# Patient Record
Sex: MALE | Race: BLACK OR AFRICAN AMERICAN | URBAN - METROPOLITAN AREA
[De-identification: names, ages, dates, MRNs, and addresses within clinical notes are randomized per-mention and may not be internally consistent; named-entity substitution may affect disease eponyms.]

---

## 2020-10-21 ENCOUNTER — TRANSCRIBE ORDER (OUTPATIENT)
Dept: SLEEP MEDICINE | Age: 70
End: 2020-10-21

## 2020-10-21 DIAGNOSIS — G47.33 OSA (OBSTRUCTIVE SLEEP APNEA): Primary | ICD-10-CM

## 2020-10-21 DIAGNOSIS — R06.83 SNORING: ICD-10-CM

## 2020-10-21 DIAGNOSIS — J43.9 EMPHYSEMA OF LUNG (HCC): ICD-10-CM

## 2020-10-21 DIAGNOSIS — E78.5 HYPERLIPIDEMIA: ICD-10-CM

## 2022-02-24 ENCOUNTER — OFFICE VISIT (OUTPATIENT)
Dept: ORTHOPEDIC SURGERY | Age: 72
End: 2022-02-24
Payer: MEDICARE

## 2022-02-24 VITALS
OXYGEN SATURATION: 92 % | RESPIRATION RATE: 16 BRPM | TEMPERATURE: 97 F | HEART RATE: 86 BPM | BODY MASS INDEX: 41.73 KG/M2 | HEIGHT: 62 IN | WEIGHT: 226.8 LBS

## 2022-02-24 DIAGNOSIS — M25.562 CHRONIC PAIN OF LEFT KNEE: Primary | ICD-10-CM

## 2022-02-24 DIAGNOSIS — G89.29 CHRONIC PAIN OF LEFT KNEE: Primary | ICD-10-CM

## 2022-02-24 DIAGNOSIS — M17.12 PRIMARY OSTEOARTHRITIS OF LEFT KNEE: ICD-10-CM

## 2022-02-24 PROCEDURE — 73562 X-RAY EXAM OF KNEE 3: CPT | Performed by: SPECIALIST

## 2022-02-24 PROCEDURE — G8432 DEP SCR NOT DOC, RNG: HCPCS | Performed by: SPECIALIST

## 2022-02-24 PROCEDURE — G8427 DOCREV CUR MEDS BY ELIG CLIN: HCPCS | Performed by: SPECIALIST

## 2022-02-24 PROCEDURE — 99203 OFFICE O/P NEW LOW 30 MIN: CPT | Performed by: SPECIALIST

## 2022-02-24 PROCEDURE — G8417 CALC BMI ABV UP PARAM F/U: HCPCS | Performed by: SPECIALIST

## 2022-02-24 PROCEDURE — 1101F PT FALLS ASSESS-DOCD LE1/YR: CPT | Performed by: SPECIALIST

## 2022-02-24 PROCEDURE — 20610 DRAIN/INJ JOINT/BURSA W/O US: CPT | Performed by: SPECIALIST

## 2022-02-24 PROCEDURE — G8536 NO DOC ELDER MAL SCRN: HCPCS | Performed by: SPECIALIST

## 2022-02-24 PROCEDURE — 3017F COLORECTAL CA SCREEN DOC REV: CPT | Performed by: SPECIALIST

## 2022-02-24 RX ORDER — ALBUTEROL SULFATE 90 UG/1
AEROSOL, METERED RESPIRATORY (INHALATION)
COMMUNITY
Start: 2022-01-04

## 2022-02-24 RX ORDER — ROSUVASTATIN CALCIUM 5 MG/1
TABLET, COATED ORAL
COMMUNITY
Start: 2022-01-25

## 2022-02-24 RX ORDER — BETAMETHASONE SODIUM PHOSPHATE AND BETAMETHASONE ACETATE 3; 3 MG/ML; MG/ML
3 INJECTION, SUSPENSION INTRA-ARTICULAR; INTRALESIONAL; INTRAMUSCULAR; SOFT TISSUE ONCE
Status: COMPLETED | OUTPATIENT
Start: 2022-02-24 | End: 2022-02-24

## 2022-02-24 RX ORDER — FINASTERIDE 5 MG/1
TABLET, FILM COATED ORAL
COMMUNITY
Start: 2022-02-18

## 2022-02-24 RX ADMIN — BETAMETHASONE SODIUM PHOSPHATE AND BETAMETHASONE ACETATE 3 MG: 3; 3 INJECTION, SUSPENSION INTRA-ARTICULAR; INTRALESIONAL; INTRAMUSCULAR; SOFT TISSUE at 14:25
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
## 2022-03-18 ENCOUNTER — OFFICE VISIT (OUTPATIENT)
Dept: ORTHOPEDIC SURGERY | Age: 72
End: 2022-03-18
Payer: MEDICARE

## 2022-03-18 VITALS
WEIGHT: 221 LBS | OXYGEN SATURATION: 92 % | HEART RATE: 83 BPM | BODY MASS INDEX: 40.67 KG/M2 | TEMPERATURE: 97.5 F | HEIGHT: 62 IN

## 2022-03-18 DIAGNOSIS — M25.462 EFFUSION OF LEFT KNEE: ICD-10-CM

## 2022-03-18 DIAGNOSIS — Z91.81 AT RISK FOR FALLS: ICD-10-CM

## 2022-03-18 DIAGNOSIS — G89.29 CHRONIC PAIN OF LEFT KNEE: ICD-10-CM

## 2022-03-18 DIAGNOSIS — M25.562 CHRONIC PAIN OF LEFT KNEE: ICD-10-CM

## 2022-03-18 DIAGNOSIS — M17.12 PRIMARY OSTEOARTHRITIS OF LEFT KNEE: Primary | ICD-10-CM

## 2022-03-18 PROCEDURE — G8536 NO DOC ELDER MAL SCRN: HCPCS | Performed by: SPECIALIST

## 2022-03-18 PROCEDURE — G8427 DOCREV CUR MEDS BY ELIG CLIN: HCPCS | Performed by: SPECIALIST

## 2022-03-18 PROCEDURE — 3017F COLORECTAL CA SCREEN DOC REV: CPT | Performed by: SPECIALIST

## 2022-03-18 PROCEDURE — 1101F PT FALLS ASSESS-DOCD LE1/YR: CPT | Performed by: SPECIALIST

## 2022-03-18 PROCEDURE — 99213 OFFICE O/P EST LOW 20 MIN: CPT | Performed by: SPECIALIST

## 2022-03-18 PROCEDURE — 20610 DRAIN/INJ JOINT/BURSA W/O US: CPT | Performed by: SPECIALIST

## 2022-03-18 PROCEDURE — G8417 CALC BMI ABV UP PARAM F/U: HCPCS | Performed by: SPECIALIST

## 2022-03-18 PROCEDURE — G8432 DEP SCR NOT DOC, RNG: HCPCS | Performed by: SPECIALIST
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
## 2022-03-25 ENCOUNTER — OFFICE VISIT (OUTPATIENT)
Dept: ORTHOPEDIC SURGERY | Age: 72
End: 2022-03-25
Payer: MEDICARE

## 2022-03-25 VITALS — BODY MASS INDEX: 40.42 KG/M2 | HEIGHT: 62 IN | TEMPERATURE: 97.3 F

## 2022-03-25 DIAGNOSIS — M25.562 CHRONIC PAIN OF LEFT KNEE: ICD-10-CM

## 2022-03-25 DIAGNOSIS — M17.12 PRIMARY OSTEOARTHRITIS OF LEFT KNEE: Primary | ICD-10-CM

## 2022-03-25 DIAGNOSIS — G89.29 CHRONIC PAIN OF LEFT KNEE: ICD-10-CM

## 2022-03-25 PROCEDURE — 20610 DRAIN/INJ JOINT/BURSA W/O US: CPT | Performed by: SPECIALIST

## 2022-03-25 NOTE — PROGRESS NOTES
Patient: Ming Montes                MRN: [id number]       SSN: [id number]  YOB: 1950        AGE: 80 y.o. SEX: male  Body mass index is 40.42 kg/m². PCP: Kyung Wolf MD  03/25/22    Chief Complaint   Patient presents with    Knee Pain     left knee pain     HISTORY:  Ming Montes is a 80 y.o. male who is seen for left knee pain. ICD-10-CM ICD-9-CM    1. Primary osteoarthritis of left knee  M17.12 715.16 sodium hyaluronate (viscosup) (ORTHOVISC) 30 mg/2 mL injection syrg 30 mg      DRAIN/INJECT LARGE JOINT/BURSA   2. Chronic pain of left knee  M25.562 719.46 sodium hyaluronate (viscosup) (ORTHOVISC) 30 mg/2 mL injection syrg 30 mg    G89.29 338.29 DRAIN/INJECT LARGE JOINT/BURSA       Chart reviewed for the following:   Ramon German MD, have reviewed the History, Physical and updated the Allergic reactions for [address] performed immediately prior to start of procedure:  Ramon German MD, have performed the following reviews on Ming Montes prior to the start of the procedure:            * Patient was identified by name and date of birth   * Agreement on procedure being performed was verified  * Risks and Benefits explained to the patient  * Procedure site verified and marked as necessary  * Patient was positioned for comfort  * Consent was obtained     Time: 2:59 PM    Date of procedure: 3/25/2022    Procedure performed by:  Eleanor Howell MD    Mr. Louie White tolerated the procedure well with no complications. PLAN:  After discussing treatment options, patient's left knee was injected with 2 cc of Orthovisc. Mr. Louie White will follow up in one week to continue his visco supplementation injection series.       Scribed by MD Obey Castellanos) as dictated by Eleanor Howell MD

## 2022-04-01 ENCOUNTER — OFFICE VISIT (OUTPATIENT)
Dept: ORTHOPEDIC SURGERY | Age: 72
End: 2022-04-01
Payer: MEDICARE

## 2022-04-01 VITALS
HEART RATE: 87 BPM | OXYGEN SATURATION: 90 % | WEIGHT: 231.2 LBS | HEIGHT: 62 IN | TEMPERATURE: 97.3 F | BODY MASS INDEX: 42.55 KG/M2

## 2022-04-01 DIAGNOSIS — M25.562 CHRONIC PAIN OF LEFT KNEE: ICD-10-CM

## 2022-04-01 DIAGNOSIS — G89.29 CHRONIC PAIN OF LEFT KNEE: ICD-10-CM

## 2022-04-01 DIAGNOSIS — M17.12 PRIMARY OSTEOARTHRITIS OF LEFT KNEE: Primary | ICD-10-CM

## 2022-04-01 PROCEDURE — 20610 DRAIN/INJ JOINT/BURSA W/O US: CPT | Performed by: SPECIALIST

## 2022-04-01 NOTE — PROGRESS NOTES
Patient: Jordyn Dye                MRN: [id number]       SSN: [id number]  YOB: 1950        AGE: 80 y.o. SEX: male  Body mass index is 42.29 kg/m². PCP: Dinesh Ross MD  04/01/22    Chief Complaint   Patient presents with    Knee Pain     LT     HISTORY:  Jordyn Dye is a 80 y.o. male who is seen for left knee pain. ICD-10-CM ICD-9-CM    1. Primary osteoarthritis of left knee  M17.12 715.16 DRAIN/INJECT LARGE JOINT/BURSA      sodium hyaluronate (viscosup) (ORTHOVISC) 30 mg/2 mL injection syrg 30 mg   2. Chronic pain of left knee  M25.562 719.46 DRAIN/INJECT LARGE JOINT/BURSA    G89.29 338.29 sodium hyaluronate (viscosup) (ORTHOVISC) 30 mg/2 mL injection syrg 30 mg       Chart reviewed for the following:   Attila Paz MD, have reviewed the History, Physical and updated the Allergic reactions for [facility] [address] performed immediately prior to start of procedure:  Attila Paz MD, have performed the following reviews on Jordyn Dye prior to the start of the procedure:            * Patient was identified by name and date of birth   * Agreement on procedure being performed was verified  * Risks and Benefits explained to the patient  * Procedure site verified and marked as necessary  * Patient was positioned for comfort  * Consent was obtained     Time: 2:22 PM     Date of procedure: 4/1/2022    Procedure performed by:  Rashida Turner MD    Mr. Veronica Smith tolerated the procedure well with no complications. PLAN:  After discussing treatment options, patient's left knee was injected with 2 cc of Orthovisc. Mr. Veronica Smith will follow up in one week to complete his visco supplementation injection series.       Scribed by Rashida Turner MD [address]) as dictated by Rashida Turner MD

## 2022-04-08 ENCOUNTER — OFFICE VISIT (OUTPATIENT)
Dept: ORTHOPEDIC SURGERY | Age: 72
End: 2022-04-08
Payer: MEDICARE

## 2022-04-08 VITALS — WEIGHT: 231 LBS | TEMPERATURE: 98.9 F | BODY MASS INDEX: 42.25 KG/M2 | OXYGEN SATURATION: 90 %

## 2022-04-08 DIAGNOSIS — M25.562 CHRONIC PAIN OF LEFT KNEE: ICD-10-CM

## 2022-04-08 DIAGNOSIS — M25.462 EFFUSION OF LEFT KNEE: ICD-10-CM

## 2022-04-08 DIAGNOSIS — G89.29 CHRONIC PAIN OF LEFT KNEE: ICD-10-CM

## 2022-04-08 DIAGNOSIS — M17.12 PRIMARY OSTEOARTHRITIS OF LEFT KNEE: Primary | ICD-10-CM

## 2022-04-08 PROCEDURE — 20610 DRAIN/INJ JOINT/BURSA W/O US: CPT | Performed by: SPECIALIST

## 2022-04-08 NOTE — PROGRESS NOTES
Patient: Yue Chavez                MRN: [id number]       SSN: [id number]  YOB: 1950        AGE: 80 y.o. SEX: male  Body mass index is 42.25 kg/m². PCP: Johnathon Beasley MD  04/08/22    CC: LEFT KNEE PAIN AND SWELLING    HISTORY:  Yue Chavez is a 80 y.o. male who is seen for left knee pain and swelling. TIME OUT performed immediately prior to start of procedure:  Abigail Sharp MD, have performed the following reviews on Yue Chavez prior to the start of the procedure:            * Patient was identified by name and date of birth   * Agreement on procedure being performed was verified  * Risks and Benefits explained to the patient  * Procedure site verified and marked as necessary  * Patient was positioned for comfort  * Consent was obtained     Time: 2:24 PM     Date of procedure: 4/8/2022    Procedure performed by:  Khadra Villarreal MD    Mr. Donato Newman tolerated the procedure well with no complications      [id number] ICD-9-CM    1. Primary osteoarthritis of left knee  M17.12 715.16 sodium hyaluronate (viscosup) (ORTHOVISC) 30 mg/2 mL injection syrg 30 mg      DRAIN/INJECT LARGE JOINT/BURSA   2. Chronic pain of left knee  M25.562 719.46 sodium hyaluronate (viscosup) (ORTHOVISC) 30 mg/2 mL injection syrg 30 mg    G89.29 338.29 DRAIN/INJECT LARGE JOINT/BURSA   3. Effusion of left knee  M25.462 719.06      PLAN:  After discussing treatment options, patient's left knee was injected with 2 cc of Orthovisc. Mr. Donato Newman will follow up PRN now that he has completed his visco supplementation injection series.       Scribed by Khadra Villarreal MD [facility]) as dictated by Khadra Villarreal MD

## 2022-07-21 ENCOUNTER — TELEPHONE (OUTPATIENT)
Dept: ORTHOPEDIC SURGERY | Age: 72
End: 2022-07-21

## 2022-07-21 NOTE — TELEPHONE ENCOUNTER
Patient left a message asking about a knee replacement. He stated he has done the shots but it is getting worse. He stated he is in [facility] 3rd floor room 307 bed B. He did not give a phone number.         For Pharmacy Admin Tracking Only    Intervention Detail: Scheduled Appointment  Time Spent (min): 5

## 2022-11-14 DIAGNOSIS — M25.562 LEFT KNEE PAIN, UNSPECIFIED CHRONICITY: ICD-10-CM

## 2022-11-14 DIAGNOSIS — M25.561 RIGHT KNEE PAIN, UNSPECIFIED CHRONICITY: Primary | ICD-10-CM

## 2022-11-18 ENCOUNTER — OFFICE VISIT (OUTPATIENT)
Dept: ORTHOPEDIC SURGERY | Age: 72
End: 2022-11-18
Payer: MEDICARE

## 2022-11-18 VITALS — HEIGHT: 62 IN | WEIGHT: 205 LBS | BODY MASS INDEX: 37.73 KG/M2

## 2022-11-18 DIAGNOSIS — M17.0 OSTEOARTHRITIS OF BOTH KNEES, UNSPECIFIED OSTEOARTHRITIS TYPE: ICD-10-CM

## 2022-11-18 DIAGNOSIS — M25.561 PAIN IN BOTH KNEES, UNSPECIFIED CHRONICITY: ICD-10-CM

## 2022-11-18 DIAGNOSIS — M25.562 PAIN IN BOTH KNEES, UNSPECIFIED CHRONICITY: Primary | ICD-10-CM

## 2022-11-18 DIAGNOSIS — M25.561 PAIN IN BOTH KNEES, UNSPECIFIED CHRONICITY: Primary | ICD-10-CM

## 2022-11-18 DIAGNOSIS — M25.562 PAIN IN BOTH KNEES, UNSPECIFIED CHRONICITY: ICD-10-CM

## 2022-11-18 PROCEDURE — G8417 CALC BMI ABV UP PARAM F/U: HCPCS | Performed by: ORTHOPAEDIC SURGERY

## 2022-11-18 PROCEDURE — 3017F COLORECTAL CA SCREEN DOC REV: CPT | Performed by: ORTHOPAEDIC SURGERY

## 2022-11-18 PROCEDURE — G8427 DOCREV CUR MEDS BY ELIG CLIN: HCPCS | Performed by: ORTHOPAEDIC SURGERY

## 2022-11-18 PROCEDURE — 1101F PT FALLS ASSESS-DOCD LE1/YR: CPT | Performed by: ORTHOPAEDIC SURGERY

## 2022-11-18 PROCEDURE — 99214 OFFICE O/P EST MOD 30 MIN: CPT | Performed by: ORTHOPAEDIC SURGERY

## 2022-11-18 PROCEDURE — G8432 DEP SCR NOT DOC, RNG: HCPCS | Performed by: ORTHOPAEDIC SURGERY

## 2022-11-18 PROCEDURE — G8536 NO DOC ELDER MAL SCRN: HCPCS | Performed by: ORTHOPAEDIC SURGERY

## 2022-11-18 PROCEDURE — 1123F ACP DISCUSS/DSCN MKR DOCD: CPT | Performed by: ORTHOPAEDIC SURGERY

## 2022-11-18 RX ORDER — GUAIFENESIN 100 MG/5ML
LIQUID (ML) ORAL
COMMUNITY
Start: 2022-11-03

## 2022-11-18 RX ORDER — IPRATROPIUM BROMIDE AND ALBUTEROL SULFATE 2.5; .5 MG/3ML; MG/3ML
SOLUTION RESPIRATORY (INHALATION)
COMMUNITY
Start: 2022-11-03

## 2022-11-18 RX ORDER — TRAMADOL HYDROCHLORIDE 50 MG/1
TABLET ORAL
COMMUNITY
Start: 2022-11-03

## 2022-11-18 RX ORDER — BUSPIRONE HYDROCHLORIDE 5 MG/1
TABLET ORAL
COMMUNITY
Start: 2022-11-03

## 2022-11-18 RX ORDER — TAMSULOSIN HYDROCHLORIDE 0.4 MG/1
CAPSULE ORAL
COMMUNITY
Start: 2022-11-03

## 2022-11-18 RX ORDER — OLANZAPINE 10 MG/1
TABLET ORAL
COMMUNITY
Start: 2022-11-03

## 2022-11-18 RX ORDER — LANOLIN ALCOHOL/MO/W.PET/CERES
100 CREAM (GRAM) TOPICAL DAILY
COMMUNITY
Start: 2022-11-03

## 2022-11-18 RX ORDER — TRAZODONE HYDROCHLORIDE 100 MG/1
TABLET ORAL
COMMUNITY
Start: 2022-11-03

## 2022-11-18 RX ORDER — FLUTICASONE FUROATE AND VILANTEROL 100; 25 UG/1; UG/1
POWDER RESPIRATORY (INHALATION)
COMMUNITY
Start: 2022-11-03

## 2022-11-18 RX ORDER — OMEPRAZOLE 20 MG/1
20 CAPSULE, DELAYED RELEASE ORAL DAILY
COMMUNITY
Start: 2022-11-03

## 2022-11-18 RX ORDER — DIVALPROEX SODIUM 250 MG/1
TABLET, DELAYED RELEASE ORAL
COMMUNITY
Start: 2022-11-03

## 2022-11-18 NOTE — PROGRESS NOTES
Name: Crystal Pena    : 1950     Service Dept: [address] and Sports Medicine    Chief Complaint   Patient presents with    Knee Pain        Visit Vitals  Ht 5' 2\" (1.575 m)   Wt 205 lb (93 kg)   BMI 37.49 kg/m²        No Known Allergies     Current Outpatient Medications   Medication Sig Dispense Refill    aspirin 81 mg chewable tablet CHEW 1 TABLET DAILY FOR HEART HEALTH      busPIRone (BUSPAR) 5 mg tablet TAKE 3 TABLETS BY MOUTH 3 TIMES A DAY FOR ANXIETY      divalproex DR (DEPAKOTE) 250 mg tablet TAKE 6 TABLETS BY MOUTH AT BEDTIME FOR SEIZURES WITH FOOD      fluticasone furoate-vilanteroL (BREO ELLIPTA) 100-25 mcg/dose inhaler INHALE 1 PUFF BY MOUTH ONCE A DAY      albuterol-ipratropium (DUO-NEB) 2.5 mg-0.5 mg/3 ml nebu INHALE 1 VIAL VIA NEBULZIER EVERY 6 HOURS AS NEEDED FOR WHEEZING OR SHORTNESS OF BREATH      OLANZapine (ZyPREXA) 10 mg tablet TAKE 1 TABLET BY MOUTH EVERY MORNING AND AT BEDTIME FOR MOOD DISORDER      omeprazole (PRILOSEC) 20 mg capsule Take 20 mg by mouth daily. tamsulosin (FLOMAX) 0.4 mg capsule TAKE 1 CAPSULE BY MOUTH AT BEDTIME      thiamine HCL (B-1) 100 mg tablet Take 100 mg by mouth daily. traMADoL (ULTRAM) 50 mg tablet TAKE 1 TABLET BY MOUTH EVERY 6 HOURS AS NEEDED FOR KNEE PAIN      traZODone (DESYREL) 100 mg tablet TAKE 1 TABLET BY MOUTH AT BEDTIME FOR INSOMNIA      albuterol (PROVENTIL HFA, VENTOLIN HFA, PROAIR HFA) 90 mcg/actuation inhaler       finasteride (PROSCAR) 5 mg tablet       rosuvastatin (CRESTOR) 5 mg tablet         There is no problem list on file for this patient. History reviewed. No pertinent family history.    Social History     Socioeconomic History    Marital status: UNKNOWN   Tobacco Use    Smoking status: Never    Smokeless tobacco: Never      Past Surgical History:   Procedure Laterality Date    HX BACK SURGERY        Past Medical History:   Diagnosis Date    Arthritis         I have reviewed and agree with [address] and ROS and intake form in chart and the record furthermore I have reviewed prior medical record(s) regarding this patients care during this appointment. Review of Systems:   Patient is a pleasant appearing individual, appropriately dressed, well hydrated, well nourished, who is alert, appropriately oriented for age, and in no acute distress with a normal gait and normal affect who does not appear to be in any significant pain. Physical Exam:  Left Knee -Decrease range of motion with flexion, Knee arc of greater than 50 degrees, Some crepitation, Grossly neurovascularly intact, Good cap refill, No skin lesion, Moderate swelling, some gross instability, Some quadriceps weakness Kellgren and Yousif at least grade 3    Right Knee -Decrease range of motion with flexion, Some crepitation, Grossly neurovascularly intact, Good cap refill, No skin lesion, Moderate swelling, some gross instability, Some quadriceps weaknessKellgren and Yousif at least grade 3        Encounter Diagnoses     ICD-10-CM ICD-9-CM   1. Pain in both knees, unspecified chronicity  M25.561 719.46    M25.562    2. Osteoarthritis of both knees, unspecified osteoarthritis type  M17.0 715.96       HPI:  The patient is here with bilateral knee pain, throbbing burning pain, progressively getting worse. Pain is 10/10. Continues to have difficulty with it. X-rays of the bilateral knees are positive for OA. Assessment/Plan:  Plan will be for left total knee replacement, 2 months later right total knee replacement. He does have a power of  because he is in a group home, so we will need a preoperative appointment and also we will put it in our notes, general medical clearance. No history of blood clots. No cardiac history. We will go from there. Addendum:  He is going to be inpatient. Possible evaluation for rehab.         As part of continued conservative pain management options the patient was advised to utilize Tylenol or OTC NSAIDS as long as it is not medically contraindicated. Return to Office: Follow-up and Dispositions    Return for schedule for surgery. Scribed by Adry Bose LPN as dictated by [facility] Melendrez MD.  Documentation True and Accepted Keny Melendrez MD

## 2022-11-18 NOTE — Clinical Note
11/21/2022    Patient: Mxaimilian Bradshaw   YOB: 1950   Date of Visit: 11/18/2022     Wojciech Hernandez MD  Via     Dear Wojciech Hernandez MD,      Thank you for referring Mr. Maximilian Bradshaw to [address] [place] [address] for evaluation. My notes for this consultation are attached. If you have questions, please do not hesitate to call me. I look forward to following your patient along with you.       Sincerely,    Vitaly Wilcox MD

## 2022-11-18 NOTE — PATIENT INSTRUCTIONS

## 2022-11-21 DIAGNOSIS — M25.562 LEFT KNEE PAIN, UNSPECIFIED CHRONICITY: ICD-10-CM

## 2022-11-21 DIAGNOSIS — M25.561 RIGHT KNEE PAIN, UNSPECIFIED CHRONICITY: ICD-10-CM

## 2022-11-21 PROCEDURE — 73562 X-RAY EXAM OF KNEE 3: CPT | Performed by: ORTHOPAEDIC SURGERY

## 2023-04-11 PROBLEM — H25.11 NUCLEAR SCLEROTIC CATARACT OF RIGHT EYE: Status: ACTIVE | Noted: 2023-04-11

## 2023-04-27 DIAGNOSIS — Z01.818 PRE-OP TESTING: ICD-10-CM

## 2023-04-27 DIAGNOSIS — M17.11 OSTEOARTHRITIS OF RIGHT KNEE, UNSPECIFIED OSTEOARTHRITIS TYPE: ICD-10-CM

## 2023-04-27 DIAGNOSIS — M17.12 UNILATERAL PRIMARY OSTEOARTHRITIS, LEFT KNEE: Primary | ICD-10-CM

## 2023-10-09 ENCOUNTER — HOSPITAL ENCOUNTER (OUTPATIENT)
Facility: HOSPITAL | Age: 73
Discharge: HOME | End: 2023-10-12
Payer: MEDICARE

## 2023-10-09 DIAGNOSIS — J44.89 OBSTRUCTIVE CHRONIC BRONCHITIS WITHOUT EXACERBATION: ICD-10-CM

## 2023-10-09 PROCEDURE — 71250 CT THORAX DX C-: CPT
